# Patient Record
Sex: FEMALE | Race: WHITE | Employment: UNEMPLOYED | URBAN - METROPOLITAN AREA
[De-identification: names, ages, dates, MRNs, and addresses within clinical notes are randomized per-mention and may not be internally consistent; named-entity substitution may affect disease eponyms.]

---

## 2019-10-17 ENCOUNTER — HOSPITAL ENCOUNTER (EMERGENCY)
Age: 54
Discharge: HOME | End: 2019-10-17
Attending: EMERGENCY MEDICINE
Payer: SUBSIDIZED

## 2019-10-17 ENCOUNTER — APPOINTMENT (OUTPATIENT)
Dept: GENERAL RADIOLOGY | Age: 54
End: 2019-10-17
Attending: EMERGENCY MEDICINE
Payer: SUBSIDIZED

## 2019-10-17 VITALS
HEART RATE: 88 BPM | OXYGEN SATURATION: 100 % | WEIGHT: 188 LBS | RESPIRATION RATE: 12 BRPM | HEIGHT: 64 IN | BODY MASS INDEX: 32.1 KG/M2 | DIASTOLIC BLOOD PRESSURE: 82 MMHG | SYSTOLIC BLOOD PRESSURE: 122 MMHG | TEMPERATURE: 98.5 F

## 2019-10-17 DIAGNOSIS — S01.01XA LACERATION OF SCALP, INITIAL ENCOUNTER: Primary | ICD-10-CM

## 2019-10-17 DIAGNOSIS — S30.0XXA CONTUSION OF SACRUM, INITIAL ENCOUNTER: ICD-10-CM

## 2019-10-17 PROCEDURE — 99283 EMERGENCY DEPT VISIT LOW MDM: CPT | Performed by: EMERGENCY MEDICINE

## 2019-10-17 PROCEDURE — 77030008462 HC STPLR SKN PROX J&J -A

## 2019-10-17 PROCEDURE — 72220 X-RAY EXAM SACRUM TAILBONE: CPT

## 2019-10-17 PROCEDURE — 72100 X-RAY EXAM L-S SPINE 2/3 VWS: CPT

## 2019-10-17 PROCEDURE — 75810000293 HC SIMP/SUPERF WND  RPR: Performed by: EMERGENCY MEDICINE

## 2019-10-17 RX ORDER — HYDROCODONE BITARTRATE AND ACETAMINOPHEN 5; 325 MG/1; MG/1
1 TABLET ORAL
Qty: 12 TAB | Refills: 0 | Status: SHIPPED | OUTPATIENT
Start: 2019-10-17 | End: 2019-10-20

## 2019-10-17 RX ORDER — LEVOTHYROXINE SODIUM 100 UG/1
100 TABLET ORAL
COMMUNITY

## 2019-10-17 NOTE — LETTER
[address] [address] EMERGENCY DEPT 
[address] 
[place] [address] 
[phone] Work/School Note Date: 10/17/2019 To Whom It May concern: 
 
Kamilla Jeff was seen and treated today in the emergency room by the following provider(s): 
Attending Provider: Vince Lazo MD. Kamilla Aguilar {Return to school/sport/work:10/19/19 Sincerely, Marcellus Max MD

## 2019-10-17 NOTE — DISCHARGE INSTRUCTIONS
Four staples needs removal in 7 days. Apply antibiotic ointment to the wound 3 times a day and keep area clean.   Return if any worsening symptoms

## 2019-10-17 NOTE — ED NOTES
I have reviewed discharge instructions with the patient. The patient verbalized understanding. Patient left ED via Discharge Method: wheelchair to Home with friend. Opportunity for questions and clarification provided. Patient given 1 scripts. To continue your aftercare when you leave the hospital, you may receive an automated call from our care team to check in on how you are doing. This is a free service and part of our promise to provide the best care and service to meet your aftercare needs.  If you have questions, or wish to unsubscribe from this service please call [phone]. Thank you for Choosing our [address] [place] [address] Emergency Department.

## 2019-10-17 NOTE — ED PROVIDER NOTES
HPI:  48 female here with scalp laceration and pain in the tailbone area status post a fall yesterday. Stated walking up the steps when she slipped on [place] and fell hitting her buttocks on the ground in the back of her head. No loss of consciousness. Otherwise no vomiting, vision changes, weakness tingling or numbness since the fall. Not on anticoagulant. Pain and achiness to the tailbone area. ROS  Constitutional: No fever, no chills  Skin: no rash  Eye: No vision changes  ENMT:  Respiratory: No shortness of breath  Cardiovascular: No chest pain  Gastrointestinal: No vomiting, no nausea, no abdominal pain  :   MSK: + back pain, no muscle pain, no joint pain  Neuro: + headache, no change in mental status, no numbness, no tingling, no weakness  Psych:   Endocrine:   All other review of systems positive per history of present illness and the above otherwise negative or noncontributory. Visit Vitals  /78 (BP Patient Position: Sitting)   Pulse 87   Temp 98.4 °F (36.9 °C)   Resp 16   Ht 5' 4\" (1.626 m)   Wt 85.3 kg (188 lb)   SpO2 96%   BMI 32.27 kg/m²     History reviewed. No pertinent past medical history. History reviewed. No pertinent surgical history. Prior to Admission Medications   Prescriptions Last Dose Informant Patient Reported? Taking?   levothyroxine (SYNTHROID) 100 mcg tablet   Yes Yes   Sig: Take 100 mcg by mouth Daily (before breakfast). Facility-Administered Medications: None         Adult Exam   General: alert, no acute distress  Head: Mid occipital with a 2 cm L-shaped laceration. No foreign body noted  ENT: moist mucous membranes  Neck: supple, non-tender; full range of motion  Cardiovascular: normal peripheral perfusion, no edema  Respiratory:  normal respirations;  Gastrointestinal: soft, non-tender  Back: Range of motion intact. Tenderness palpation over the sacral area.   No obvious bruising noted  Musculoskeletal: normal range of motion, normal strength, no gross deformities  Neurological: alert and oriented x 4, no gross focal deficits; normal speech  Psychiatric: cooperative; appropriate mood and affect    MDM:  Nontoxic. Neuro intact. Do not feel CT head is necessary. X-ray unremarkable. 4 Staples placed. Recommend wound care. Tetanus up-to-date. Will discharge home with Norco.  Return precautions given. No further questions or concerns      Procedure Note - Laceration repair  Performed by: Adrian Saleem MD  Immediately prior to the procedure, the patient was reevaluated and found suitable for the planned procedure and any planned medications. Immediately prior to the procedure a timeout was called to verify the correct patient, procedure, equipment, and markings as appropriate. Complexity: simple  A 2 cm laceration to the scalp was irrigated copiously. The area was anesthetized with 3 mL of 1% lidocaine with epinephrine. The wound was explored and no foreign bodies were found. The wound was repaired with 4 staples. The wound was closed with good hemostasis and approximation. A dressing was applied. The procedure took 10 minutes. The patient tolerated the procedure well. Xr Spine Lumb 2 Or 3 V    Result Date: 10/17/2019  History: Moderate low back pain after fall yesterday Lumbar spine series FINDINGS: 4 views of the lumbar spine demonstrate multilevel facet arthropathy. There is preservation of vertebral body height and alignment. The pedicles are intact. The SI joints are patent. Clips are present from prior cholecystectomy. IMPRESSION: Degenerative change at multiple levels within the lumbar spine. No acute findings. Xr Sacrum And Coccyx    Result Date: 10/17/2019  History: Sacral pain after fall yesterday EXAM: Sacrococcygeal series FINDINGS: The SI joints are patent. No disruption of the arcuate lines demonstrated. There is multilevel facet arthropathy. IMPRESSION: Degenerative change of the low lumbar spine.  No abnormality of the sacrum or ryanyx. No results found for this or any previous visit (from the past 24 hour(s)). Dragon voice recognition software was used to create this note. Although the note has been reviewed and corrected where necessary, additional errors may have been overlooked and remain in the text.

## 2019-10-17 NOTE — ED TRIAGE NOTES
Patient fell yesterday going of some stairs. Patient co headache, lower back pain. Patient denies any loc. Patient has a laceration to back of head.   Not able to visualize laceration due to dry blood on patients hair

## 2019-10-27 ENCOUNTER — HOSPITAL ENCOUNTER (EMERGENCY)
Age: 54
Discharge: HOME | End: 2019-10-27
Attending: EMERGENCY MEDICINE | Admitting: EMERGENCY MEDICINE
Payer: SUBSIDIZED

## 2019-10-27 ENCOUNTER — APPOINTMENT (OUTPATIENT)
Dept: CT IMAGING | Age: 54
End: 2019-10-27
Attending: EMERGENCY MEDICINE
Payer: SUBSIDIZED

## 2019-10-27 VITALS
DIASTOLIC BLOOD PRESSURE: 101 MMHG | OXYGEN SATURATION: 99 % | HEART RATE: 70 BPM | SYSTOLIC BLOOD PRESSURE: 166 MMHG | RESPIRATION RATE: 16 BRPM | BODY MASS INDEX: 31.76 KG/M2 | HEIGHT: 64 IN | TEMPERATURE: 98.5 F | WEIGHT: 186 LBS

## 2019-10-27 DIAGNOSIS — T81.30XA DEHISCENCE OF LACERATION WOUND, INITIAL ENCOUNTER: Primary | ICD-10-CM

## 2019-10-27 PROCEDURE — 99282 EMERGENCY DEPT VISIT SF MDM: CPT | Performed by: EMERGENCY MEDICINE

## 2019-10-27 PROCEDURE — 70450 CT HEAD/BRAIN W/O DYE: CPT

## 2019-10-27 NOTE — ED TRIAGE NOTES
Patient advises slip and fall on 10/17, states staples to back of head. Patient advises for past 8 days she has been bumping into things and having trouble walking. States feels like floor is moving worse after first getting up.

## 2019-10-27 NOTE — ED PROVIDER NOTES
56-year-old lady presents with concerns about some balance issues after falling and hitting her head about a week ago. She says that she also has some staples in there that need to come out. She is at the time that she fell she was days but does not think she actually had loss of consciousness. She said that she has not had any vomiting. She says she has had a mild headache at times it feels like the floor is moving under her as if she were on a boat. She says it is not persistent and it seems to be better today than it was for the past couple of days. She says she has had no fevers or chills. She said that the area where the wound was feels like it did not heal.    Elements of this note were created using speech recognition software. As such, errors of speech recognition may be present. No past medical history on file. No past surgical history on file. No family history on file.     Social History     Socioeconomic History    Marital status:      Spouse name: Not on file    Number of children: Not on file    Years of education: Not on file    Highest education level: Not on file   Occupational History    Not on file   Social Needs    Financial resource strain: Not on file    Food insecurity:     Worry: Not on file     Inability: Not on file    Transportation needs:     Medical: Not on file     Non-medical: Not on file   Tobacco Use    Smoking status: Never Smoker    Smokeless tobacco: Never Used   Substance and Sexual Activity    Alcohol use: Never     Frequency: Never    Drug use: Never    Sexual activity: Not on file   Lifestyle    Physical activity:     Days per week: Not on file     Minutes per session: Not on file    Stress: Not on file   Relationships    Social connections:     Talks on phone: Not on file     Gets together: Not on file     Attends Druze service: Not on file     Active member of club or organization: Not on file     Attends meetings of clubs or organizations: Not on file     Relationship status: Not on file    Intimate partner violence:     Fear of current or ex partner: Not on file     Emotionally abused: Not on file     Physically abused: Not on file     Forced sexual activity: Not on file   Other Topics Concern    Not on file   Social History Narrative    Not on file         ALLERGIES: Patient has no known allergies. Review of Systems   Constitutional: Negative for chills, diaphoresis and fever. HENT: Negative for congestion, rhinorrhea and sore throat. Eyes: Negative for redness and visual disturbance. Respiratory: Negative for cough, chest tightness, shortness of breath and wheezing. Cardiovascular: Negative for chest pain and palpitations. Gastrointestinal: Negative for abdominal pain, blood in stool, diarrhea, nausea and vomiting. Endocrine: Negative for polydipsia and polyuria. Genitourinary: Negative for dysuria and hematuria. Musculoskeletal: Negative for arthralgias, myalgias and neck stiffness. Skin: Positive for wound. Negative for rash. Allergic/Immunologic: Negative for environmental allergies and food allergies. Neurological: Positive for dizziness. Negative for weakness and headaches. Hematological: Negative for adenopathy. Does not bruise/bleed easily. Psychiatric/Behavioral: Negative for confusion and sleep disturbance. The patient is not nervous/anxious. Vitals:    10/27/19 1732   BP: (!) 166/101   Pulse: 70   Resp: 16   Temp: 98.5 °F (36.9 °C)   SpO2: 99%   Weight: 84.4 kg (186 lb)   Height: 5' 4\" (1.626 m)            Physical Exam   Constitutional: She is oriented to person, place, and time. HENT:   Head: The wound has dehisced and the edges are no longer approximated. Musculoskeletal: Normal range of motion. She exhibits no deformity. Neurological: She is alert and oriented to person, place, and time. No cranial nerve deficit.  Coordination normal.   Patient is able to ambulate in the emerged from without any difficulty. MDM  Number of Diagnoses or Management Options  Dehiscence of laceration wound, initial encounter:   Diagnosis management comments: Given her balance problems I will get a CT scan of her head. I think the wound will need to heal by secondary intention but I will refer her to a plastic surgeon for further evaluation. I removed the 3 remaining staples that were hanging on by a thread. CT of her head revealed no acute intracranial injury. I will discharge her home and encourage her to follow-up with a plastic surgeon.          Procedures

## 2019-10-27 NOTE — ED NOTES
I have reviewed discharge instructions with the patient. The patient verbalized understanding. Patient left ED via Discharge Method: ambulatory to Home with family. Opportunity for questions and clarification provided. Patient given 0 scripts. Follow up with plastic surgery reviewed.

## 2020-04-18 ENCOUNTER — HOSPITAL ENCOUNTER (EMERGENCY)
Age: 55
Discharge: HOME | End: 2020-04-18
Payer: SUBSIDIZED

## 2020-04-18 ENCOUNTER — APPOINTMENT (OUTPATIENT)
Dept: GENERAL RADIOLOGY | Age: 55
End: 2020-04-18
Payer: SUBSIDIZED

## 2020-04-18 VITALS
SYSTOLIC BLOOD PRESSURE: 127 MMHG | HEART RATE: 83 BPM | TEMPERATURE: 98.2 F | RESPIRATION RATE: 17 BRPM | OXYGEN SATURATION: 93 % | DIASTOLIC BLOOD PRESSURE: 77 MMHG

## 2020-04-18 DIAGNOSIS — R53.81 MALAISE AND FATIGUE: ICD-10-CM

## 2020-04-18 DIAGNOSIS — F41.1 ANXIETY REACTION: Primary | ICD-10-CM

## 2020-04-18 DIAGNOSIS — R53.83 MALAISE AND FATIGUE: ICD-10-CM

## 2020-04-18 LAB
ALBUMIN SERPL-MCNC: 3.5 G/DL (ref 3.5–5)
ALBUMIN/GLOB SERPL: 0.9 {RATIO} (ref 1.2–3.5)
ALP SERPL-CCNC: 100 U/L (ref 50–136)
ALT SERPL-CCNC: 62 U/L (ref 12–65)
AMPHET UR QL SCN: NEGATIVE
ANION GAP SERPL CALC-SCNC: 13 MMOL/L (ref 7–16)
APPEARANCE UR: CLEAR
AST SERPL-CCNC: 61 U/L (ref 15–37)
ATRIAL RATE: 95 BPM
BARBITURATES UR QL SCN: NEGATIVE
BASOPHILS # BLD: 0 K/UL (ref 0–0.2)
BASOPHILS NFR BLD: 0 % (ref 0–2)
BENZODIAZ UR QL: NEGATIVE
BILIRUB SERPL-MCNC: 0.2 MG/DL (ref 0.2–1.1)
BILIRUB UR QL: NEGATIVE
BUN SERPL-MCNC: 8 MG/DL (ref 6–23)
CALCIUM SERPL-MCNC: 8.7 MG/DL (ref 8.3–10.4)
CALCULATED P AXIS, ECG09: 49 DEGREES
CALCULATED R AXIS, ECG10: -12 DEGREES
CALCULATED T AXIS, ECG11: 21 DEGREES
CANNABINOIDS UR QL SCN: NEGATIVE
CHLORIDE SERPL-SCNC: 103 MMOL/L (ref 98–107)
CO2 SERPL-SCNC: 25 MMOL/L (ref 21–32)
COCAINE UR QL SCN: NEGATIVE
COLOR UR: YELLOW
CREAT SERPL-MCNC: 0.61 MG/DL (ref 0.6–1)
DIAGNOSIS, 93000: NORMAL
DIFFERENTIAL METHOD BLD: ABNORMAL
EOSINOPHIL # BLD: 0 K/UL (ref 0–0.8)
EOSINOPHIL NFR BLD: 0 % (ref 0.5–7.8)
ERYTHROCYTE [DISTWIDTH] IN BLOOD BY AUTOMATED COUNT: 16.2 % (ref 11.9–14.6)
GLOBULIN SER CALC-MCNC: 4.1 G/DL (ref 2.3–3.5)
GLUCOSE SERPL-MCNC: 181 MG/DL (ref 65–100)
GLUCOSE UR STRIP.AUTO-MCNC: NEGATIVE MG/DL
HCT VFR BLD AUTO: 43.3 % (ref 35.8–46.3)
HGB BLD-MCNC: 14.4 G/DL (ref 11.7–15.4)
HGB UR QL STRIP: NEGATIVE
IMM GRANULOCYTES # BLD AUTO: 0 K/UL (ref 0–0.5)
IMM GRANULOCYTES NFR BLD AUTO: 0 % (ref 0–5)
KETONES UR QL STRIP.AUTO: 40 MG/DL
LEUKOCYTE ESTERASE UR QL STRIP.AUTO: NEGATIVE
LYMPHOCYTES # BLD: 0.8 K/UL (ref 0.5–4.6)
LYMPHOCYTES NFR BLD: 8 % (ref 13–44)
MAGNESIUM SERPL-MCNC: 1.6 MG/DL (ref 1.8–2.4)
MCH RBC QN AUTO: 34.3 PG (ref 26.1–32.9)
MCHC RBC AUTO-ENTMCNC: 33.3 G/DL (ref 31.4–35)
MCV RBC AUTO: 103.1 FL (ref 79.6–97.8)
METHADONE UR QL: NEGATIVE
MONOCYTES # BLD: 0.5 K/UL (ref 0.1–1.3)
MONOCYTES NFR BLD: 5 % (ref 4–12)
NEUTS SEG # BLD: 8.5 K/UL (ref 1.7–8.2)
NEUTS SEG NFR BLD: 86 % (ref 43–78)
NITRITE UR QL STRIP.AUTO: NEGATIVE
NRBC # BLD: 0 K/UL (ref 0–0.2)
OPIATES UR QL: NEGATIVE
P-R INTERVAL, ECG05: 154 MS
PCP UR QL: NEGATIVE
PH UR STRIP: 5 [PH] (ref 5–9)
PLATELET # BLD AUTO: 179 K/UL (ref 150–450)
PMV BLD AUTO: 10.4 FL (ref 9.4–12.3)
POTASSIUM SERPL-SCNC: 3.3 MMOL/L (ref 3.5–5.1)
PROT SERPL-MCNC: 7.6 G/DL (ref 6.3–8.2)
PROT UR STRIP-MCNC: NEGATIVE MG/DL
Q-T INTERVAL, ECG07: 344 MS
QRS DURATION, ECG06: 84 MS
QTC CALCULATION (BEZET), ECG08: 432 MS
RBC # BLD AUTO: 4.2 M/UL (ref 4.05–5.2)
SODIUM SERPL-SCNC: 141 MMOL/L (ref 136–145)
SP GR UR REFRACTOMETRY: 1.01 (ref 1–1.02)
TSH SERPL DL<=0.005 MIU/L-ACNC: 0.46 UIU/ML
UROBILINOGEN UR QL STRIP.AUTO: 0.2 EU/DL (ref 0.2–1)
VENTRICULAR RATE, ECG03: 95 BPM
WBC # BLD AUTO: 10 K/UL (ref 4.3–11.1)

## 2020-04-18 PROCEDURE — 99285 EMERGENCY DEPT VISIT HI MDM: CPT

## 2020-04-18 PROCEDURE — 83735 ASSAY OF MAGNESIUM: CPT

## 2020-04-18 PROCEDURE — 93005 ELECTROCARDIOGRAM TRACING: CPT

## 2020-04-18 PROCEDURE — 80307 DRUG TEST PRSMV CHEM ANLYZR: CPT

## 2020-04-18 PROCEDURE — 71045 X-RAY EXAM CHEST 1 VIEW: CPT

## 2020-04-18 PROCEDURE — 80053 COMPREHEN METABOLIC PANEL: CPT

## 2020-04-18 PROCEDURE — 81003 URINALYSIS AUTO W/O SCOPE: CPT

## 2020-04-18 PROCEDURE — 85025 COMPLETE CBC W/AUTO DIFF WBC: CPT

## 2020-04-18 PROCEDURE — 74011250636 HC RX REV CODE- 250/636

## 2020-04-18 PROCEDURE — 84443 ASSAY THYROID STIM HORMONE: CPT

## 2020-04-18 RX ORDER — SODIUM CHLORIDE 9 MG/ML
1000 INJECTION, SOLUTION INTRAVENOUS ONCE
Status: COMPLETED | OUTPATIENT
Start: 2020-04-18 | End: 2020-04-18

## 2020-04-18 RX ORDER — HYDROXYZINE PAMOATE 25 MG/1
25 CAPSULE ORAL
Qty: 10 CAP | Refills: 0 | Status: SHIPPED | OUTPATIENT
Start: 2020-04-18 | End: 2020-05-02

## 2020-04-18 RX ADMIN — SODIUM CHLORIDE 1000 ML: 900 INJECTION, SOLUTION INTRAVENOUS at 12:44

## 2020-04-18 NOTE — ED NOTES
I have reviewed discharge instructions with the patient. The patient verbalized understanding. Patient left ED via Discharge Method: ambulatory to Home with self. Opportunity for questions and clarification provided. Patient given 1 scripts. To continue your aftercare when you leave the hospital, you may receive an automated call from our care team to check in on how you are doing. This is a free service and part of our promise to provide the best care and service to meet your aftercare needs.  If you have questions, or wish to unsubscribe from this service please call [phone]. Thank you for Choosing our [facility] Emergency Department.

## 2020-04-18 NOTE — LETTER
[facility] EMERGENCY DEPT 
[address] 
[facility] [address] [phone] Work/School Note Date: 4/18/2020 To Whom It May concern: 
 
Haylie Terrazas was seen and treated today in the emergency room by the following provider(s): 
Attending Provider: Vasu Daigle MD. Haylie Terrazas may return to work on 04/20/20. Sincerely, Saulo Davies RN

## 2020-04-18 NOTE — ED TRIAGE NOTES
Patient presents to the ER with vague complaints over the past few weeks. Patient has bilateral hand and feet swelling, no swelling of the lips face or tongue. Patient c/o fatigue since Thursday, but this is a periodic issue. VSS in triage. Patient is obviously anxious.

## 2020-04-18 NOTE — DISCHARGE INSTRUCTIONS
Patient Education        Fatigue: Care Instructions  Your Care Instructions    Fatigue is a feeling of tiredness, exhaustion, or lack of energy. You may feel fatigue because of too much or not enough activity. It can also come from stress, lack of sleep, boredom, and poor diet. Many medical problems, such as viral infections, can cause fatigue. Emotional problems, especially depression, are often the cause of fatigue. Fatigue is most often a symptom of another problem. Treatment for fatigue depends on the cause. For example, if you have fatigue because you have a certain health problem, treating this problem also treats your fatigue. If depression or anxiety is the cause, treatment may help. Follow-up care is a key part of your treatment and safety. Be sure to make and go to all appointments, and call your doctor if you are having problems. It's also a good idea to know your test results and keep a list of the medicines you take. How can you care for yourself at home? · Get regular exercise. But don't overdo it. Go back and forth between rest and exercise. · Get plenty of rest.  · Eat a healthy diet. Do not skip meals, especially breakfast.  · Reduce your use of caffeine, tobacco, and alcohol. Caffeine is most often found in coffee, tea, cola drinks, and chocolate. · Limit medicines that can cause fatigue. This includes tranquilizers and cold and allergy medicines. When should you call for help? Watch closely for changes in your health, and be sure to contact your doctor if:    · You have new symptoms such as fever or a rash.     · Your fatigue gets worse.     · You have been feeling down, depressed, or hopeless. Or you may have lost interest in things that you usually enjoy.     · You are not getting better as expected. Where can you learn more? Go to http://juany-taya.info/  Enter [id number] in the search box to learn more about \"Fatigue: Care Instructions. \"  Current as of: June 26, 2019Content Version: 12.4  © 7464-2527 Healthwise, [employer]. Care instructions adapted under license by [employer] (which disclaims liability or warranty for this information). If you have questions about a medical condition or this instruction, always ask your healthcare professional. [address] any warranty or liability for your use of this information. Patient Education        Learning About Generalized Anxiety Disorder  What is generalized anxiety disorder? We all worry. It's a normal part of life. But when you have generalized anxiety disorder, you worry about lots of things and have a hard time stopping your worry. This worry or anxiety interferes with your relationships, work, and life. What causes it? The cause is not known. But it may be passed down through families. What are the symptoms? You may feel anxious or worry most days about things like work, relationships, health, or money. You may worry about things that are unlikely to happen. You find it hard to stop or control the worry. Because you worry a lot and try hard to stop worrying, you may feel restless, tired, tense, or cranky. You may also find it hard to think or sleep. And you may have headaches or an upset stomach. How is it diagnosed? Your doctor will ask about your health and how often you worry or feel anxious. He or she may ask about other symptoms, like whether you:  · Feel restless. · Feel tired. · Have a hard time thinking or feel that your mind goes blank. · Feel cranky. · Have tense muscles. · Have sleep problems. A physical exam and tests can help make sure that your symptoms aren't caused by a different condition, such as a thyroid problem. How is it treated? Counseling and medicine can both work to treat anxiety. The two are often used along with lifestyle changes. Cognitive-behavioral therapy (CBT) is a type of counseling that's used to help treat anxiety.  In CBT, you learn how to notice and replace thoughts that make you feel worried. It also can help you learn how to relax when you worry. Medicines can help. These medicines are often also used for depression. Selective serotonin reuptake inhibitors (SSRIs) are often tried first. But there are other medicines that your doctor may use. You may need to try a few medicines to find one that works well. Many people feel better by getting regular exercise, eating healthy meals, and getting good sleep. Mindfulness--focusing on things that happen in the present moment--also can help reduce your anxiety. What can you expect when you have it? Having anxiety can be upsetting. Some people might feel less worried and stressed after a couple of months of treatment. But for other people, it might take longer to feel better. Reaching out to people for help is important. Try not to isolate yourself. Let your family and friends help you. Find someone you can trust and confide in. Talk to that person. When you know what anxiety is--and how you can get help for it--you can start to learn new ways of thinking. This can help you cope and work through your anxiety. Follow-up care is a key part of your treatment and safety. Be sure to make and go to all appointments, and call your doctor if you are having problems. It's also a good idea to know your test results and keep a list of the medicines you take. Where can you learn more? Go to http://juany-taya.info/  Enter G110 in the search box to learn more about \"Learning About Generalized Anxiety Disorder. \"  Current as of: May 28, 2019Content Version: 12.4  © 3144-3119 Healthwise, [employer]. Care instructions adapted under license by [employer] (which disclaims liability or warranty for this information).  If you have questions about a medical condition or this instruction, always ask your healthcare professional. [employer] any warranty or liability for your use of this information.

## 2020-04-18 NOTE — ED PROVIDER NOTES
61-year-old female complaining of multiple complaints. She initially checked in with bilateral feet and hand swelling however changed complaint to fatigue and malaise. She stated Thursday last week she was told by a coworker that she looked pale since then she is very anxious. Patient had some blood work done as an outpatient clinic elevated close for the pandemic as she cannot get her results. Fatigue   This is a new problem. The current episode started more than 2 days ago. The problem has been gradually worsening. There was no focality noted. Pertinent negatives include no focal weakness, no loss of balance, no slurred speech, no speech difficulty, no memory loss, no movement disorder, no mental status change and no disorientation. Patient reports a subjective fever - was not measured. Past Medical History:   Diagnosis Date    BLAYNE positive     CREST syndrome ([facility] [address]     Hashimoto's disease     Raynaud disease        Past Surgical History:   Procedure Laterality Date    HX CHOLECYSTECTOMY      HX PARTIAL HYSTERECTOMY      HX TUBAL LIGATION           History reviewed. No pertinent family history.     Social History     Socioeconomic History    Marital status:      Spouse name: Not on file    Number of children: Not on file    Years of education: Not on file    Highest education level: Not on file   Occupational History    Not on file   Social Needs    Financial resource strain: Not on file    Food insecurity     Worry: Not on file     Inability: Not on file    Transportation needs     Medical: Not on file     Non-medical: Not on file   Tobacco Use    Smoking status: Light Tobacco Smoker    Smokeless tobacco: Never Used   Substance and Sexual Activity    Alcohol use: Yes     Frequency: Never    Drug use: Never    Sexual activity: Not on file   Lifestyle    Physical activity     Days per week: Not on file     Minutes per session: Not on file    Stress: Not on file Relationships    Social connections     Talks on phone: Not on file     Gets together: Not on file     Attends Tenriism service: Not on file     Active member of club or organization: Not on file     Attends meetings of clubs or organizations: Not on file     Relationship status: Not on file    Intimate partner violence     Fear of current or ex partner: Not on file     Emotionally abused: Not on file     Physically abused: Not on file     Forced sexual activity: Not on file   Other Topics Concern    Not on file   Social History Narrative    Not on file         ALLERGIES: Sulfa (sulfonamide antibiotics)    Review of Systems   Constitutional: Positive for fatigue. Negative for activity change. HENT: Negative. Eyes: Negative. Respiratory: Negative. Cardiovascular: Negative. Gastrointestinal: Negative. Genitourinary: Negative. Musculoskeletal: Negative. Skin: Negative. Neurological: Negative. Negative for focal weakness, speech difficulty and loss of balance. Psychiatric/Behavioral: Negative. Negative for memory loss. All other systems reviewed and are negative. Vitals:    04/18/20 1146   BP: 120/83   Pulse: (!) 109   Resp: 18   Temp: 98.4 °F (36.9 °C)   SpO2: 97%            Physical Exam  Vitals signs and nursing note reviewed. Constitutional:       General: She is not in acute distress. Appearance: She is well-developed. She is not diaphoretic. HENT:      Head: Normocephalic and atraumatic. Right Ear: External ear normal.      Left Ear: External ear normal.      Nose: Nose normal.      Mouth/Throat:      Pharynx: No oropharyngeal exudate. Eyes:      General: No scleral icterus. Right eye: No discharge. Left eye: No discharge. Conjunctiva/sclera: Conjunctivae normal.      Pupils: Pupils are equal, round, and reactive to light. Neck:      Musculoskeletal: Normal range of motion and neck supple. Vascular: No JVD.       Trachea: No tracheal deviation. Cardiovascular:      Rate and Rhythm: Normal rate and regular rhythm. Pulmonary:      Effort: Pulmonary effort is normal. No respiratory distress. Breath sounds: Normal breath sounds. No stridor. No wheezing. Chest:      Chest wall: No tenderness. Abdominal:      General: Bowel sounds are normal. There is no distension. Palpations: Abdomen is soft. There is no mass. Tenderness: There is no abdominal tenderness. Musculoskeletal: Normal range of motion. General: No tenderness. Skin:     General: Skin is warm and dry. Coloration: Skin is not pale. Findings: No erythema or rash. Neurological:      Mental Status: She is alert and oriented to person, place, and time. Cranial Nerves: No cranial nerve deficit. Psychiatric:         Behavior: Behavior normal.         Thought Content: Thought content normal.          MDM  Number of Diagnoses or Management Options  Anxiety reaction: new and requires workup  Malaise and fatigue: new and requires workup  Diagnosis management comments: Patient lab test normal patient is anxious and this may explain some of her complaints. No swelling is noted to her hands or feet. Lung sounds were clear heart was regular rate and rhythm physical exam is otherwise reassuring she is to follow-up closely with primary care doctor drink plenty of fluids avoid caffeine alcohol and nicotine.          Procedures

## 2021-04-28 ENCOUNTER — HOSPITAL ENCOUNTER (EMERGENCY)
Age: 56
Discharge: HOME | End: 2021-04-28
Attending: EMERGENCY MEDICINE

## 2021-04-28 ENCOUNTER — APPOINTMENT (OUTPATIENT)
Dept: CT IMAGING | Age: 56
End: 2021-04-28
Attending: PHYSICIAN ASSISTANT

## 2021-04-28 VITALS
DIASTOLIC BLOOD PRESSURE: 59 MMHG | TEMPERATURE: 98.3 F | BODY MASS INDEX: 28.66 KG/M2 | HEIGHT: 65 IN | WEIGHT: 172 LBS | SYSTOLIC BLOOD PRESSURE: 91 MMHG | OXYGEN SATURATION: 96 % | RESPIRATION RATE: 20 BRPM | HEART RATE: 64 BPM

## 2021-04-28 DIAGNOSIS — R45.89 FEELING ANXIOUS: ICD-10-CM

## 2021-04-28 DIAGNOSIS — R29.6 FALLS FREQUENTLY: Primary | ICD-10-CM

## 2021-04-28 DIAGNOSIS — R26.89 BALANCE PROBLEM: ICD-10-CM

## 2021-04-28 LAB
ALBUMIN SERPL-MCNC: 3.5 G/DL (ref 3.5–5)
ALBUMIN/GLOB SERPL: 0.9 {RATIO} (ref 1.2–3.5)
ALP SERPL-CCNC: 87 U/L (ref 50–136)
ALT SERPL-CCNC: 66 U/L (ref 12–65)
ANION GAP SERPL CALC-SCNC: 10 MMOL/L (ref 7–16)
AST SERPL-CCNC: 91 U/L (ref 15–37)
BASOPHILS # BLD: 0.1 K/UL (ref 0–0.2)
BASOPHILS NFR BLD: 1 % (ref 0–2)
BILIRUB SERPL-MCNC: 0.3 MG/DL (ref 0.2–1.1)
BUN SERPL-MCNC: 9 MG/DL (ref 6–23)
CALCIUM SERPL-MCNC: 8.6 MG/DL (ref 8.3–10.4)
CHLORIDE SERPL-SCNC: 103 MMOL/L (ref 98–107)
CO2 SERPL-SCNC: 29 MMOL/L (ref 21–32)
CREAT SERPL-MCNC: 0.35 MG/DL (ref 0.6–1)
DIFFERENTIAL METHOD BLD: ABNORMAL
EOSINOPHIL # BLD: 0.1 K/UL (ref 0–0.8)
EOSINOPHIL NFR BLD: 1 % (ref 0.5–7.8)
ERYTHROCYTE [DISTWIDTH] IN BLOOD BY AUTOMATED COUNT: 14.1 % (ref 11.9–14.6)
GLOBULIN SER CALC-MCNC: 3.8 G/DL (ref 2.3–3.5)
GLUCOSE SERPL-MCNC: 93 MG/DL (ref 65–100)
HCT VFR BLD AUTO: 40.7 % (ref 35.8–46.3)
HGB BLD-MCNC: 14.2 G/DL (ref 11.7–15.4)
IMM GRANULOCYTES # BLD AUTO: 0 K/UL (ref 0–0.5)
IMM GRANULOCYTES NFR BLD AUTO: 1 % (ref 0–5)
LYMPHOCYTES # BLD: 1.7 K/UL (ref 0.5–4.6)
LYMPHOCYTES NFR BLD: 27 % (ref 13–44)
MCH RBC QN AUTO: 35.9 PG (ref 26.1–32.9)
MCHC RBC AUTO-ENTMCNC: 34.9 G/DL (ref 31.4–35)
MCV RBC AUTO: 103 FL (ref 79.6–97.8)
MONOCYTES # BLD: 0.7 K/UL (ref 0.1–1.3)
MONOCYTES NFR BLD: 10 % (ref 4–12)
NEUTS SEG # BLD: 3.8 K/UL (ref 1.7–8.2)
NEUTS SEG NFR BLD: 60 % (ref 43–78)
NRBC # BLD: 0 K/UL (ref 0–0.2)
PLATELET # BLD AUTO: 173 K/UL (ref 150–450)
PMV BLD AUTO: 9.9 FL (ref 9.4–12.3)
POTASSIUM SERPL-SCNC: 3.3 MMOL/L (ref 3.5–5.1)
PROT SERPL-MCNC: 7.3 G/DL (ref 6.3–8.2)
RBC # BLD AUTO: 3.95 M/UL (ref 4.05–5.2)
SODIUM SERPL-SCNC: 142 MMOL/L (ref 136–145)
TSH SERPL DL<=0.005 MIU/L-ACNC: 2.68 UIU/ML
WBC # BLD AUTO: 6.3 K/UL (ref 4.3–11.1)

## 2021-04-28 PROCEDURE — 96374 THER/PROPH/DIAG INJ IV PUSH: CPT

## 2021-04-28 PROCEDURE — 70450 CT HEAD/BRAIN W/O DYE: CPT

## 2021-04-28 PROCEDURE — 84443 ASSAY THYROID STIM HORMONE: CPT

## 2021-04-28 PROCEDURE — 80053 COMPREHEN METABOLIC PANEL: CPT

## 2021-04-28 PROCEDURE — 74011250636 HC RX REV CODE- 250/636: Performed by: PHYSICIAN ASSISTANT

## 2021-04-28 PROCEDURE — 96375 TX/PRO/DX INJ NEW DRUG ADDON: CPT

## 2021-04-28 PROCEDURE — 85025 COMPLETE CBC W/AUTO DIFF WBC: CPT

## 2021-04-28 PROCEDURE — 99284 EMERGENCY DEPT VISIT MOD MDM: CPT

## 2021-04-28 RX ORDER — ONDANSETRON 2 MG/ML
4 INJECTION INTRAMUSCULAR; INTRAVENOUS
Status: COMPLETED | OUTPATIENT
Start: 2021-04-28 | End: 2021-04-28

## 2021-04-28 RX ORDER — HYDROXYZINE 50 MG/1
50 TABLET, FILM COATED ORAL
Qty: 20 TAB | Refills: 0 | Status: SHIPPED | OUTPATIENT
Start: 2021-04-28 | End: 2021-05-08

## 2021-04-28 RX ORDER — LORAZEPAM 2 MG/ML
1 INJECTION INTRAMUSCULAR
Status: COMPLETED | OUTPATIENT
Start: 2021-04-28 | End: 2021-04-28

## 2021-04-28 RX ADMIN — SODIUM CHLORIDE 1000 ML: 900 INJECTION, SOLUTION INTRAVENOUS at 13:03

## 2021-04-28 RX ADMIN — LORAZEPAM 1 MG: 2 INJECTION INTRAMUSCULAR; INTRAVENOUS at 13:02

## 2021-04-28 RX ADMIN — ONDANSETRON 4 MG: 2 INJECTION INTRAMUSCULAR; INTRAVENOUS at 13:46

## 2021-04-28 NOTE — ED TRIAGE NOTES
Pt masked prior to triage. Pt c/o loss of coordination, memory loss, mood swings since a fall with head injury [id number].

## 2021-04-28 NOTE — DISCHARGE INSTRUCTIONS
Recommend following up with your primary doctor. I also recommend continue taking all medications as prescribed and taking hydroxyzine every 6-8 hours as needed for feeling anxious. I do recommend following up with the neurologist given your balance and memory issues and frequent falls. Return if you develop any severe worsening or emergent symptoms.

## 2021-04-28 NOTE — ED PROVIDER NOTES
This patient is a 80-year-old female who comes in complaining of multiple vague complaints. Patient is with her daughter who states that the patient has been extremely anxious and peterson over the past several months. She reports that her moods are very labile and she sometimes is tearful and then anxious and then angry within a few minutes and she seems to be having difficulty with this at home. She also reports that over the last year she has had some balance issues and has had increasing falls. She says this started after she fell and hit her head on some cement. She had a CT scan and an MRI of her brain after the initial injury which both were negative. She was told she had a concussion and she says that ever since then she has just not felt totally normal.  She denies speech difficulty or visual changes. She does report intermittent difficulty with ambulation because of her balance issues. She denies any vomiting or recent head trauma or loss of consciousness. She denies chest pain or shortness of breath. She does report that she has a history of hypothyroidism and is on medication for this. Past Medical History:   Diagnosis Date    BLAYNE positive     CREST syndrome ([facility] [address]     Hashimoto's disease     Raynaud disease        Past Surgical History:   Procedure Laterality Date    HX CHOLECYSTECTOMY      HX PARTIAL HYSTERECTOMY      HX TUBAL LIGATION           No family history on file.     Social History     Socioeconomic History    Marital status:      Spouse name: Not on file    Number of children: Not on file    Years of education: Not on file    Highest education level: Not on file   Occupational History    Not on file   Social Needs    Financial resource strain: Not on file    Food insecurity     Worry: Not on file     Inability: Not on file    Transportation needs     Medical: Not on file     Non-medical: Not on file   Tobacco Use    Smoking status: Light Tobacco Smoker  Smokeless tobacco: Never Used   Substance and Sexual Activity    Alcohol use: Yes     Frequency: Never    Drug use: Never    Sexual activity: Not on file   Lifestyle    Physical activity     Days per week: Not on file     Minutes per session: Not on file    Stress: Not on file   Relationships    Social connections     Talks on phone: Not on file     Gets together: Not on file     Attends Hindu service: Not on file     Active member of club or organization: Not on file     Attends meetings of clubs or organizations: Not on file     Relationship status: Not on file    Intimate partner violence     Fear of current or ex partner: Not on file     Emotionally abused: Not on file     Physically abused: Not on file     Forced sexual activity: Not on file   Other Topics Concern    Not on file   Social History Narrative    Not on file         ALLERGIES: Sulfa (sulfonamide antibiotics)    Review of Systems   Constitutional: Negative for chills and fever. Eyes: Negative for visual disturbance. Respiratory: Negative for cough and shortness of breath. Cardiovascular: Negative for chest pain. Gastrointestinal: Negative for abdominal distention, abdominal pain, nausea and vomiting. Musculoskeletal: Positive for gait problem. Negative for back pain, joint swelling, myalgias and neck pain. Skin: Negative for color change. Neurological: Negative for dizziness, seizures, syncope, numbness and headaches. All other systems reviewed and are negative. Vitals:    04/28/21 1107 04/28/21 1303 04/28/21 1304   BP: 122/81 111/61    Pulse: 80     Resp: 20     Temp: 98.5 °F (36.9 °C)     SpO2: 92%  97%   Weight: 78 kg (172 lb)     Height: 5' 5\" (1.651 m)              Physical Exam  Vitals signs and nursing note reviewed. Constitutional:       General: She is not in acute distress. Appearance: Normal appearance. She is not ill-appearing, toxic-appearing or diaphoretic.    HENT:      Head: Normocephalic and atraumatic. Eyes:      Conjunctiva/sclera: Conjunctivae normal.   Cardiovascular:      Rate and Rhythm: Normal rate and regular rhythm. Pulses: Normal pulses. Pulmonary:      Effort: Pulmonary effort is normal. No respiratory distress. Breath sounds: Normal air entry. No stridor, decreased air movement or transmitted upper airway sounds. No decreased breath sounds. Abdominal:      General: Abdomen is flat. Bowel sounds are normal.      Palpations: Abdomen is soft. Tenderness: There is no abdominal tenderness. Skin:     General: Skin is warm and dry. Neurological:      General: No focal deficit present. Mental Status: She is alert and oriented to person, place, and time. Mental status is at baseline. Psychiatric:         Mood and Affect: Mood is anxious. Affect is labile. Speech: She is communicative. Speech is tangential. Speech is not rapid and pressured or slurred. Behavior: Behavior is agitated. Behavior is not hyperactive or combative. Behavior is cooperative. MDM  Number of Diagnoses or Management Options  Balance problem: new and requires workup  Falls frequently: new and requires workup  Feeling anxious: new and requires workup  Diagnosis management comments: 53-year-old female comes in with concern for frequent falls and balance problems which has been going on for about a year. She says initially it started after she had a fall in which she hit her head on a cement. She had a negative CT and MRI at that time. She also reports feeling extremely anxious and her daughter reports that her moods have been labile and she has also been having some memory issues on and off. Given her symptoms I obtained a CT scan of her head as well as baseline labs. TSH also obtained given her history of hypothyroidism. Labs are unremarkable, urine negative, CT of the head was negative.   Patient was given a dose of Ativan and reported some improvement in her symptoms. I do recommend that she follow-up closely with a neurologist given her recent balance issues however since this has been going on for approximately a year I do not feel further emergent work-up is required at this time. Patient is amenable to this plan and her daughter is also in agreement. Will be prescribed hydroxyzine as needed for anxiety.        Amount and/or Complexity of Data Reviewed  Clinical lab tests: reviewed  Tests in the radiology section of CPT®: reviewed  Tests in the medicine section of CPT®: reviewed and ordered    Risk of Complications, Morbidity, and/or Mortality  Presenting problems: moderate  Diagnostic procedures: low  Management options: low    Patient Progress  Patient progress: stable         Procedures

## 2022-03-18 PROBLEM — R26.89 BALANCE PROBLEM: Status: ACTIVE | Noted: 2021-04-28

## 2022-03-19 PROBLEM — R29.6 FALLS FREQUENTLY: Status: ACTIVE | Noted: 2021-04-28

## 2022-03-19 PROBLEM — R45.89 FEELING ANXIOUS: Status: ACTIVE | Noted: 2021-04-28

## 2025-04-29 ENCOUNTER — OFFICE VISIT (OUTPATIENT)
Age: 60
End: 2025-04-29

## 2025-04-29 DIAGNOSIS — G89.29 CHRONIC BILATERAL LOW BACK PAIN WITH BILATERAL SCIATICA: Primary | ICD-10-CM

## 2025-04-29 DIAGNOSIS — M25.50 POLYARTHRALGIA: ICD-10-CM

## 2025-04-29 DIAGNOSIS — M54.41 CHRONIC BILATERAL LOW BACK PAIN WITH BILATERAL SCIATICA: Primary | ICD-10-CM

## 2025-04-29 DIAGNOSIS — Z98.1 HISTORY OF LUMBAR SPINAL FUSION: ICD-10-CM

## 2025-04-29 DIAGNOSIS — Z91.89 AT RISK FOR POLYPHARMACY: ICD-10-CM

## 2025-04-29 DIAGNOSIS — M54.42 CHRONIC BILATERAL LOW BACK PAIN WITH BILATERAL SCIATICA: Primary | ICD-10-CM

## 2025-04-29 DIAGNOSIS — M15.0 PRIMARY OSTEOARTHRITIS INVOLVING MULTIPLE JOINTS: ICD-10-CM

## 2025-04-29 RX ORDER — BUPRENORPHINE 15 UG/H
1 PATCH TRANSDERMAL WEEKLY
Qty: 4 PATCH | Refills: 1 | Status: SHIPPED | OUTPATIENT
Start: 2025-04-29 | End: 2025-06-24

## 2025-04-29 RX ORDER — BUPRENORPHINE 10 UG/H
1 PATCH TRANSDERMAL WEEKLY
COMMUNITY
Start: 2025-04-15 | End: 2025-04-29

## 2025-04-29 ASSESSMENT — ENCOUNTER SYMPTOMS
SHORTNESS OF BREATH: 0
BACK PAIN: 1
ABDOMINAL PAIN: 0

## 2025-04-29 NOTE — PROGRESS NOTES
space narrowing, and facet arthropathy.     Soft Tissues  Cholecystectomy clips. Scattered surgical clips.   MRI cervical spine 10/2024  FINDINGS:  Osseous structures:   Intense inflammatory changes in the endplates and nearly throughout the entirety of the vertebral bodies C4 and C5. There is no fracture line.  No signal abnormality present in the cord. No syrinx. Visualized brain parenchyma unremarkable.  Naso and oropharyngeal mucosa demonstrates no acute pathology. No concerning thyroid mass nor lymphadenopathy. Vasculature demonstrates no significant stenosis     C2-3:  No significant central canal nor neural foraminal narrowing  C3-4:  Broad-based disc osteophyte complex with severe uncovertebral hypertrophy. Mild to moderate central stenosis measuring 10 mm AP. Mild bilateral neural foraminal encroachment.  C4-5:  Broad-based disc osteophyte complex and severe uncovertebral hypertrophy. Mild/moderate central stenosis. Moderate bilateral neural foraminal encroachment. Minimal flattening of the cord without edema. There is intense endplate and vertebral body inflammatory change without fluid in the disc space. This is favored to represent an inflammatory process rather than infection.  C5-6:  Moderate uncovertebral hypertrophy bilaterally with moderate bilateral neural foraminal encroachment. Mild central stenosis.  C6-7:  Mild bilateral uncovertebral hypertrophy without significant central stenosis. Mild bilateral neural foraminal encroachment.  C7-T1:  No significant central canal nor neural foraminal narrowing     IMPRESSION:  1.  Severe inflammatory changes within the vertebral bodies at C4 and C4-5. Trace C3 inferior endplate marrow edema. Similar appearance to the uncovertebral hypertrophy with neuroforaminal encroachment most notable at C4-5. Inflammation could irritate the cord or adjacent nerve roots at this level.                                              Previous therapies:  Type of Therapy Date

## 2025-06-24 ENCOUNTER — OFFICE VISIT (OUTPATIENT)
Age: 60
End: 2025-06-24
Payer: MEDICARE

## 2025-06-24 DIAGNOSIS — M54.41 CHRONIC BILATERAL LOW BACK PAIN WITH BILATERAL SCIATICA: Primary | ICD-10-CM

## 2025-06-24 DIAGNOSIS — M15.0 PRIMARY OSTEOARTHRITIS INVOLVING MULTIPLE JOINTS: ICD-10-CM

## 2025-06-24 DIAGNOSIS — Z98.1 HISTORY OF LUMBAR SPINAL FUSION: ICD-10-CM

## 2025-06-24 DIAGNOSIS — G89.29 CHRONIC BILATERAL LOW BACK PAIN WITH BILATERAL SCIATICA: Primary | ICD-10-CM

## 2025-06-24 DIAGNOSIS — M54.42 CHRONIC BILATERAL LOW BACK PAIN WITH BILATERAL SCIATICA: Primary | ICD-10-CM

## 2025-06-24 DIAGNOSIS — M25.50 POLYARTHRALGIA: ICD-10-CM

## 2025-06-24 PROCEDURE — G2211 COMPLEX E/M VISIT ADD ON: HCPCS | Performed by: ANESTHESIOLOGY

## 2025-06-24 PROCEDURE — 99214 OFFICE O/P EST MOD 30 MIN: CPT | Performed by: ANESTHESIOLOGY

## 2025-06-24 RX ORDER — BUPRENORPHINE 20 UG/H
1 PATCH TRANSDERMAL
Qty: 4 PATCH | Refills: 2 | Status: SHIPPED | OUTPATIENT
Start: 2025-06-24 | End: 2025-09-16

## 2025-06-24 ASSESSMENT — ENCOUNTER SYMPTOMS
BACK PAIN: 1
ABDOMINAL PAIN: 0
SHORTNESS OF BREATH: 0

## 2025-06-24 NOTE — PROGRESS NOTES
Chronic Pain Follow-up Note    Date: June 24, 2025  Patient Name: Anju Valdovinos  MRN: [id number]  PCP: No primary care provider on file.  Referring Provider: No ref. provider found    Assessment:   Diagnosis:  1. Chronic bilateral low back pain with bilateral sciatica    2. History of lumbar spinal fusion    3. Polyarthralgia    4. Primary osteoarthritis involving multiple joints      Plan:      General Recommendations: The pain condition that the patient suffers from is best treated with a multidisciplinary approach that involves an increase in physical activity to prevent de-conditioning and worsening of the pain cycle, as well as psychological counseling (formal and/or informal) to address the co-morbid psychological effects of pain.  Treatment will often involve judicious use of pain medications and interventional procedures to decrease the pain, allowing the patient to participate in the physical activity that will ultimately produce long-lasting pain reductions.  The goal of the multidisciplinary approach is to return the patient to a higher level of overall function and to restore their ability to perform activities of daily living.    Testing:  Reviewed rheumatology notes.     Therapy:  Completed physical therapy from 8/2024 to present which has made the pain worse.     Medications:  Lyrica was switched back to gabapentin by her neurologist.  We will defer management of this category medications to them.  She does report significant sedation from gabapentin.  Is currently on Celebrex.  Robaxin previously discontinued in an effort to reduce the number of sedating medication she was on.  Has tried Cymbalta without benefit with side effects.  Increasing Butrans patches to 20 mcg/h changed every 7 days, 4 patches, with 2 refills.  Buprenorphine’s unique receptor binding and activation profile provides several benefits over traditional opioids making it a safer and, often more effective, alternative.  These

## 2025-07-15 ENCOUNTER — CLINICAL DOCUMENTATION (OUTPATIENT)
Age: 60
End: 2025-07-15